# Patient Record
Sex: FEMALE | Race: WHITE | NOT HISPANIC OR LATINO | Employment: OTHER | URBAN - METROPOLITAN AREA
[De-identification: names, ages, dates, MRNs, and addresses within clinical notes are randomized per-mention and may not be internally consistent; named-entity substitution may affect disease eponyms.]

---

## 2024-01-25 ENCOUNTER — HOSPITAL ENCOUNTER (OUTPATIENT)
Dept: RADIOLOGY | Facility: HOSPITAL | Age: 84
Discharge: HOME | End: 2024-01-25
Payer: MEDICARE

## 2024-01-25 DIAGNOSIS — Z85.3 PERSONAL HISTORY OF MALIGNANT NEOPLASM OF BREAST: ICD-10-CM

## 2024-01-25 DIAGNOSIS — Z08 ENCOUNTER FOR FOLLOW-UP EXAMINATION AFTER COMPLETED TREATMENT FOR MALIGNANT NEOPLASM: ICD-10-CM

## 2024-01-25 PROCEDURE — 77061 BREAST TOMOSYNTHESIS UNI: CPT | Mod: RT

## 2024-01-25 PROCEDURE — G0279 TOMOSYNTHESIS, MAMMO: HCPCS | Mod: RIGHT SIDE | Performed by: RADIOLOGY

## 2024-01-25 PROCEDURE — 77065 DX MAMMO INCL CAD UNI: CPT | Mod: RIGHT SIDE | Performed by: RADIOLOGY

## 2024-08-13 PROBLEM — C50.912 MALIGNANT NEOPLASM OF LEFT BREAST IN FEMALE, ESTROGEN RECEPTOR POSITIVE (MULTI): Status: ACTIVE | Noted: 2024-08-13

## 2024-08-13 PROBLEM — Z17.0 MALIGNANT NEOPLASM OF LEFT BREAST IN FEMALE, ESTROGEN RECEPTOR POSITIVE (MULTI): Status: ACTIVE | Noted: 2024-08-13

## 2024-08-13 PROBLEM — Z79.810 ENCOUNTER FOR MONITORING TAMOXIFEN THERAPY: Status: ACTIVE | Noted: 2024-08-13

## 2024-08-13 PROBLEM — Z51.81 ENCOUNTER FOR MONITORING TAMOXIFEN THERAPY: Status: ACTIVE | Noted: 2024-08-13

## 2024-08-13 PROBLEM — Z08 ENCOUNTER FOR FOLLOW-UP SURVEILLANCE OF BREAST CANCER: Status: ACTIVE | Noted: 2024-08-13

## 2024-08-13 PROBLEM — Z12.31 ENCOUNTER FOR SCREENING MAMMOGRAM FOR MALIGNANT NEOPLASM OF BREAST: Status: ACTIVE | Noted: 2024-08-13

## 2024-08-13 PROBLEM — Z85.3 ENCOUNTER FOR FOLLOW-UP SURVEILLANCE OF BREAST CANCER: Status: ACTIVE | Noted: 2024-08-13

## 2024-08-14 ENCOUNTER — OFFICE VISIT (OUTPATIENT)
Dept: HEMATOLOGY/ONCOLOGY | Facility: CLINIC | Age: 84
End: 2024-08-14
Payer: MEDICARE

## 2024-08-14 VITALS
HEART RATE: 75 BPM | BODY MASS INDEX: 22.47 KG/M2 | DIASTOLIC BLOOD PRESSURE: 81 MMHG | TEMPERATURE: 98.8 F | SYSTOLIC BLOOD PRESSURE: 118 MMHG | WEIGHT: 135.03 LBS

## 2024-08-14 DIAGNOSIS — Z17.0 MALIGNANT NEOPLASM OF LEFT BREAST IN FEMALE, ESTROGEN RECEPTOR POSITIVE, UNSPECIFIED SITE OF BREAST (MULTI): Primary | ICD-10-CM

## 2024-08-14 DIAGNOSIS — Z85.3 ENCOUNTER FOR FOLLOW-UP SURVEILLANCE OF BREAST CANCER: ICD-10-CM

## 2024-08-14 DIAGNOSIS — Z51.81 ENCOUNTER FOR MONITORING TAMOXIFEN THERAPY: ICD-10-CM

## 2024-08-14 DIAGNOSIS — Z79.810 ENCOUNTER FOR MONITORING TAMOXIFEN THERAPY: ICD-10-CM

## 2024-08-14 DIAGNOSIS — C50.912 MALIGNANT NEOPLASM OF LEFT BREAST IN FEMALE, ESTROGEN RECEPTOR POSITIVE, UNSPECIFIED SITE OF BREAST (MULTI): Primary | ICD-10-CM

## 2024-08-14 DIAGNOSIS — Z08 ENCOUNTER FOR FOLLOW-UP SURVEILLANCE OF BREAST CANCER: ICD-10-CM

## 2024-08-14 DIAGNOSIS — Z12.31 ENCOUNTER FOR SCREENING MAMMOGRAM FOR MALIGNANT NEOPLASM OF BREAST: ICD-10-CM

## 2024-08-14 PROCEDURE — 1159F MED LIST DOCD IN RCRD: CPT | Performed by: NURSE PRACTITIONER

## 2024-08-14 PROCEDURE — 1160F RVW MEDS BY RX/DR IN RCRD: CPT | Performed by: NURSE PRACTITIONER

## 2024-08-14 PROCEDURE — 1126F AMNT PAIN NOTED NONE PRSNT: CPT | Performed by: NURSE PRACTITIONER

## 2024-08-14 PROCEDURE — 99215 OFFICE O/P EST HI 40 MIN: CPT | Performed by: NURSE PRACTITIONER

## 2024-08-14 RX ORDER — TAMOXIFEN CITRATE 20 MG/1
20 TABLET ORAL DAILY
Qty: 90 TABLET | Refills: 3 | Status: SHIPPED | OUTPATIENT
Start: 2024-08-14 | End: 2025-08-14

## 2024-08-14 RX ORDER — DEXTROMETHORPHAN HYDROBROMIDE, GUAIFENESIN 5; 100 MG/5ML; MG/5ML
650 LIQUID ORAL EVERY 8 HOURS PRN
COMMUNITY

## 2024-08-14 RX ORDER — LEVOTHYROXINE SODIUM 100 UG/1
100 CAPSULE ORAL DAILY
COMMUNITY

## 2024-08-14 RX ORDER — TAMOXIFEN CITRATE 20 MG/1
20 TABLET ORAL DAILY
COMMUNITY
End: 2024-08-14 | Stop reason: SDUPTHER

## 2024-08-14 RX ORDER — PRAVASTATIN SODIUM 10 MG/1
10 TABLET ORAL NIGHTLY
COMMUNITY

## 2024-08-14 ASSESSMENT — PATIENT HEALTH QUESTIONNAIRE - PHQ9
SUM OF ALL RESPONSES TO PHQ9 QUESTIONS 1 & 2: 0
1. LITTLE INTEREST OR PLEASURE IN DOING THINGS: NOT AT ALL
2. FEELING DOWN, DEPRESSED OR HOPELESS: NOT AT ALL

## 2024-08-14 ASSESSMENT — PAIN SCALES - GENERAL: PAINLEVEL: 0-NO PAIN

## 2024-08-14 NOTE — PROGRESS NOTES
Patient ID: Lucrecia Avery is a 83 y.o. female.  BREAST CANCER DIAGNOSIS:   Dec 2019 self detected left breast mass. Clinical T2 (2.8cm) N0 MX ILC of left breast, grade 2, ER > 95%, KS negative and HER2 negative. Mammaprint result showing low risk luminal A. Started neoadjuvant Tamoxifen. 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 Continued on Tamoxifen.     Past Medical History: Lucrecia has a past medical history of Breast cancer (Multi) and Personal history of (healed) traumatic fracture.  Surgical History:  Lucrecia has a past surgical history that includes Other surgical history (2020); Other surgical history (2020); Other surgical history (2020); Other surgical history (2020); Other surgical history (2020); Other surgical history (2020); and Other surgical history (2020).  Social History:  Lucrecia lives with  Marcelino- has alzheimer's disease   - 2 daughters, 1 son. 6 Grandchildren. - Marcelino for 58 yrs.  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     Social Substance History:  ·  Smoking Status never smoker   ·  Alcohol Use occasionally, 1 drink every other week   ·  Drug Use denies   ·  Additional History       Family History:    Family History   Problem Relation Name Age of Onset    Breast cancer Paternal Grandmother       Family Oncology History:  Cancer-related family history includes Breast cancer in her paternal grandmother.    HISTORY OF PRESENT ILLNESS:  Lucrecia Avery is a 83 y.o. female who presents today for Breast cancer treatment follow-up and surveillance. She is compliant on daily tamoxifen. She is doing well today. She has no new breast cancer concerns.      She denies any chest pain or breathing issues with exception to unchanged shortness of breath with stairs when doing laundry which is unchanged. She denies any concerning cough     She denies any  vision changes or headache issues, dizziness or loss of balance, no falls      She denies any new or unexplained bone aches or pains with exception to baseline age related joint pain and stiffness with worsened knee pain. She is no longer able to take ibuprofen due to worsened kidney function.       She denies any skin lesions or masses, oral sores lesions or infections     She reports a normal appetite and normal bowel movements with miralax, normal urination. She is not sexually active.     She denies any issues with sleep. She struggles her whole life with low energy. She reports increase in situational fatigue as her 's dementia has worsened. She nightly will fall asleep during the 6pm news.      She walks for exercise 3 days per week with her . Takes a daily Vit D3 2,000 International Unit(s). She takes a calcium 600 mg daily.     Review of Systems - Oncology  ROS 14 points performed, See HPI for exceptions    OBJECTIVE:    VS / Pain:  /81 (BP Location: Left arm, Patient Position: Sitting, BP Cuff Size: Small adult)   Pulse 75   Temp 37.1 °C (98.8 °F) (Temporal)   Wt 61.3 kg (135 lb 0.5 oz)   BMI 22.47 kg/m²   BSA: 1.68 meters squared   Pain Scale: 3  ECO- Restricted in physically strenuous activity.  Carries out light duty.           Physical Exam  Constitutional: Well developed, awake/alert/oriented x4, no distress, alert and cooperative  EYES: Sclera clear  ENMT: mucous membranes moist, no apparent injury, no lesions seen  Head/Neck: Neck supple, no apparent injury, thyroid without mass or tenderness, No JVD, trachea midline, no bruits  Respiratory / Thoracic: Patent airways, clear to all lobes, normal breath sounds with good chest expansion, thorax symmetric.  Cardiovascular: Regular, rate and rhythm, no murmurs, 2+ equal pulses of the extremities, normal auscultated S 1and S 2  GI: Nondistended, soft, non-tender, no rebound tenderness or guarding, no masses palpable, no  organomegaly, +BS, no bruits  Musculoskeletal: ROM intact, no joint swelling, normal strength, no spinal tenderness  Extremities: normal extremities, no cyanosis edema, contusions or wounds, no clubbing  Neurological: alert and oriented x4, intact senses, motor, response and reflexes, normal strength  Breast: s/p left mastectomy. No palpable masses or lesions in right breast or left chest wall   Lymphatic: No cervical, supraclavicular, infraclavicular or axillary lymphadenopathy  Psychological: Appropriate and talkative mood and behavior  Skin: Warm and dry, no lesions, no rashes, no jaundice    Diagnostic Results     BI mammo right diagnostic tomosynthesis 01/25/2024    Narrative  Interpreted By:  Mariano Quiroga,  STUDY:  BI MAMMO RIGHT DIAGNOSTIC TOMOSYNTHESIS;  1/25/2024 11:47 am    ACCESSION NUMBER(S):  [id number]    ORDERING CLINICIAN:  MARCO CASIANO    INDICATION:  Signs/Symptoms: Annual right mammogram. History of left mastectomy  Z08: Encounter for follow-up surveillance of breast cancer Z85.3: .    COMPARISON:  02/16/2023 and 02/07/2017    FINDINGS:  2D and tomosynthesis images of the right breast were reviewed at 1 mm  slice thickness.    Density:  The breast tissue is almost entirely fatty.    No suspicious masses or calcifications are identified.    This study was interpreted with CAD. Markers: [place]- skin lesion;  triangle- palpable abnormality    Impression  No mammographic evidence of malignancy.    BI-RADS CATEGORY:    BI-RADS Category:  1 Negative.  Recommendation:  Routine Screening Mammogram in 1 Year.  Recommended Date:  1 Year.  Laterality:  Bilateral.    For any future breast imaging appointments, please call [phone]  ([phone]).      MACRO:  None    Signed by: Mariano Quiroga 1/25/2024 12:33 PM  Dictation workstation:   [id number]       === 08/15/23 ===    DEXA BONE DENSITY    - Impression -  DEXA:  According to World Health Organization criteria,  classification is  low bone mass T-score  -2.3 (osteopenia)    Followup recommended in two years or sooner as clinically warranted.    All images and detailed analysis are available on the  Radiology  PACS.    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 Mammoprint low luminal A. She was initiated on neoadjuvant Tamoxifen Dec 2019/Jan 2020 per Dr Abraham Nicole. S/p Simple mastectomy with SLND- mixed IDC. ILC 0/2 SLN. Tamoxifen resumed following post op healing.     Again reviewed CTS-5 for recurrence risk in the 5-10 year period, is considered intermediate risk. We have reviewed a 7-10 year course is warranted. She is agreeable to this plan - goal Dec 2026.      No evidence today of side effects or toxicities on tamoxifen. I have updated prescription today. She will now follow with me annually and I will manage annual mammograms. She is agreeable to this plan         Bone Health, General Health maintenance.   Established with PCP Dr Ospina- last DEXA completed Fall 2023 showing T-score -2.3 osteopenia.        General Health Maintenance  Follows with PCP and GYN         At least 25 minutes of direct  consultation was spent with the patient today reviewing her cancer care plan, educating and answering questions regarding ongoing follow up, greater than 50% in counseling and coordination of care.    Treatment Plan:  [No matching plan found]         Thank you for the opportunity to be involved in the care of Lucrecia Avery.   We discussed the clinical significance of diagnosis, goals of care and treatment plan in detail.   Please do not hesitate to reach out with any questions. Thank you.     -------------------------------------------------------------------------------------------------------------------------------  Anneliese Kaminski MSN, APRN, FNP-C  [facility]  Division of Medical Oncology- Breast   Collaborating Physician Dr. Alberto Rogers   Team Nurse Partners Heidy Galicia, Allison Starkey [place] [address]  Phone: [phone]  Fax: [phone]  Available via Secure Chat    Confidential Peer Review Document-  Privilege  Privileged Pursuant to [place] Revised Code Section 2305.24, .25, .251 & .252

## 2024-08-14 NOTE — PATIENT INSTRUCTIONS
Anneliese BROCK CNP follow-up August 2025. Today we have again reviewed CTS-5 for recurrence risk in the  5-10 year period, is considered intermediate risk. We have reviewed a 7-10 year course is warranted. Plan is Dec 2026.  I have updated your prescription today for daily tamoxifen.     Bianka Burnette CNP follow up has completed.      GYN appointment as scheduled.     PCP  Dr Ospina annually and as needed. I would reconsider seeing ortho for knee arthritis. It is safe to add in glucosamine / Chondroitin      Call with any questions, concerns or treatment intolerance prior to next office visit [phone].

## 2025-05-07 ENCOUNTER — HOSPITAL ENCOUNTER (OUTPATIENT)
Dept: RADIOLOGY | Facility: HOSPITAL | Age: 85
Discharge: HOME | End: 2025-05-07
Payer: MEDICARE

## 2025-05-07 VITALS — WEIGHT: 135 LBS | HEIGHT: 65 IN | BODY MASS INDEX: 22.49 KG/M2

## 2025-05-07 DIAGNOSIS — Z17.0 MALIGNANT NEOPLASM OF LEFT BREAST IN FEMALE, ESTROGEN RECEPTOR POSITIVE, UNSPECIFIED SITE OF BREAST: ICD-10-CM

## 2025-05-07 DIAGNOSIS — Z85.3 ENCOUNTER FOR FOLLOW-UP SURVEILLANCE OF BREAST CANCER: ICD-10-CM

## 2025-05-07 DIAGNOSIS — C50.912 MALIGNANT NEOPLASM OF LEFT BREAST IN FEMALE, ESTROGEN RECEPTOR POSITIVE, UNSPECIFIED SITE OF BREAST: ICD-10-CM

## 2025-05-07 DIAGNOSIS — Z12.31 ENCOUNTER FOR SCREENING MAMMOGRAM FOR MALIGNANT NEOPLASM OF BREAST: ICD-10-CM

## 2025-05-07 DIAGNOSIS — Z51.81 ENCOUNTER FOR MONITORING TAMOXIFEN THERAPY: ICD-10-CM

## 2025-05-07 DIAGNOSIS — Z08 ENCOUNTER FOR FOLLOW-UP SURVEILLANCE OF BREAST CANCER: ICD-10-CM

## 2025-05-07 DIAGNOSIS — Z79.810 ENCOUNTER FOR MONITORING TAMOXIFEN THERAPY: ICD-10-CM

## 2025-05-07 PROCEDURE — 77067 SCR MAMMO BI INCL CAD: CPT | Mod: RIGHT SIDE | Performed by: RADIOLOGY

## 2025-05-07 PROCEDURE — 77061 BREAST TOMOSYNTHESIS UNI: CPT | Mod: RT

## 2025-05-07 PROCEDURE — 77063 BREAST TOMOSYNTHESIS BI: CPT | Mod: RIGHT SIDE | Performed by: RADIOLOGY

## 2025-05-08 ENCOUNTER — TELEPHONE (OUTPATIENT)
Dept: HEMATOLOGY/ONCOLOGY | Facility: HOSPITAL | Age: 85
End: 2025-05-08
Payer: MEDICARE

## 2025-08-05 ENCOUNTER — OFFICE VISIT (OUTPATIENT)
Dept: HEMATOLOGY/ONCOLOGY | Facility: CLINIC | Age: 85
End: 2025-08-05
Payer: MEDICARE

## 2025-08-05 VITALS
DIASTOLIC BLOOD PRESSURE: 82 MMHG | HEART RATE: 71 BPM | OXYGEN SATURATION: 95 % | TEMPERATURE: 96.5 F | WEIGHT: 130.4 LBS | SYSTOLIC BLOOD PRESSURE: 122 MMHG | BODY MASS INDEX: 21.7 KG/M2

## 2025-08-05 DIAGNOSIS — Z12.31 ENCOUNTER FOR SCREENING MAMMOGRAM FOR MALIGNANT NEOPLASM OF BREAST: ICD-10-CM

## 2025-08-05 DIAGNOSIS — Z17.0 MALIGNANT NEOPLASM OF LEFT BREAST IN FEMALE, ESTROGEN RECEPTOR POSITIVE, UNSPECIFIED SITE OF BREAST: ICD-10-CM

## 2025-08-05 DIAGNOSIS — Z79.810 ENCOUNTER FOR MONITORING TAMOXIFEN THERAPY: ICD-10-CM

## 2025-08-05 DIAGNOSIS — Z08 ENCOUNTER FOR FOLLOW-UP SURVEILLANCE OF BREAST CANCER: ICD-10-CM

## 2025-08-05 DIAGNOSIS — Z51.81 ENCOUNTER FOR MONITORING TAMOXIFEN THERAPY: ICD-10-CM

## 2025-08-05 DIAGNOSIS — Z85.3 ENCOUNTER FOR FOLLOW-UP SURVEILLANCE OF BREAST CANCER: ICD-10-CM

## 2025-08-05 DIAGNOSIS — C50.912 MALIGNANT NEOPLASM OF LEFT BREAST IN FEMALE, ESTROGEN RECEPTOR POSITIVE, UNSPECIFIED SITE OF BREAST: ICD-10-CM

## 2025-08-05 PROCEDURE — 99214 OFFICE O/P EST MOD 30 MIN: CPT | Performed by: NURSE PRACTITIONER

## 2025-08-05 PROCEDURE — 1036F TOBACCO NON-USER: CPT | Performed by: NURSE PRACTITIONER

## 2025-08-05 PROCEDURE — 1160F RVW MEDS BY RX/DR IN RCRD: CPT | Performed by: NURSE PRACTITIONER

## 2025-08-05 PROCEDURE — 1159F MED LIST DOCD IN RCRD: CPT | Performed by: NURSE PRACTITIONER

## 2025-08-05 PROCEDURE — 1126F AMNT PAIN NOTED NONE PRSNT: CPT | Performed by: NURSE PRACTITIONER

## 2025-08-05 RX ORDER — TAMOXIFEN CITRATE 20 MG/1
20 TABLET ORAL DAILY
Qty: 90 TABLET | Refills: 3 | Status: SHIPPED | OUTPATIENT
Start: 2025-08-05 | End: 2026-08-05

## 2025-08-05 ASSESSMENT — PAIN SCALES - GENERAL: PAINLEVEL_OUTOF10: 0-NO PAIN

## 2025-08-05 NOTE — PROGRESS NOTES
Patient ID: Lucrecia Avery is a 84 y.o. female.  BREAST CANCER DIAGNOSIS:   Dec 2019 self detected left breast mass. Clinical T2 (2.8cm) N0 MX ILC of left breast, grade 2, ER > 95%, [place] negative and HER2 negative. Mammaprint result showing low risk luminal A. Started neoadjuvant Tamoxifen. 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 Continued on Tamoxifen.     Past Medical History: Lucrecia has a past medical history of Breast cancer, antineoplastic chemo (2019), and Personal history of (healed) traumatic fracture.  Surgical History:  Lucrecia has a past surgical history that includes Other surgical history (2020); Other surgical history (2020); Other surgical history (2020); Other surgical history (2020); Other surgical history (2020); Other surgical history (2020); Other surgical history (2020); Mastectomy (Left, ); and Breast biopsy ().  Social History:  Lucrecia lives with  Marcelino- has alzheimer's disease   - 2 daughters, 1 son. 6 Grandchildren. - Marcelino for 58 yrs.  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     Social Substance History:  ·  Smoking Status never smoker   ·  Alcohol Use occasionally, 1 drink every other week   ·  Drug Use denies   ·  Additional History       Family History:    Family History   Problem Relation Name Age of Onset    Breast cancer Paternal Grandmother       Family Oncology History:  Cancer-related family history includes Breast cancer in her paternal grandmother.    HISTORY OF PRESENT ILLNESS:  Lucrecia Avery is a 84 y.o. female who presents today for Breast cancer treatment follow-up and surveillance. Today I have reviewed with the patient I will be conducting a clinical physical breast exam. Patient has declined a second medical professional today during the exam as a chapperone.     She is compliant on daily tamoxifen. She is  doing well today. She has no new breast cancer concerns. Reports increase in stress due to moving her  to [facility] and grandson with a recent motorcycle accident.      She denies any chest pain or breathing issues with exception to unchanged baseline shortness of breath with stairs when doing laundry which is unchanged. She denies any concerning cough     She report she is due for hearing test dur to worsening of hearing and is due for updated glasses prescription. She otherwise denies any vision changes or headache issues, dizziness or loss of balance, no falls      She denies any new or unexplained bone aches or pains with exception to baseline age related joint pain and stiffness with worsened knee pain. She did complete an knee injection which has helped substantially.      She denies any skin lesions or masses, oral sores lesions or infections     She reports a normal appetite and normal bowel movements with miralax, normal urination. She has seen Uro-GYN for night time urination- did trial a medication that was causing some aches so she stopped this.      She denies any issues with sleep.      She walks for exercise when she is able. Takes a daily Vit D3 2,000 International Unit(s). She takes a calcium 600 mg daily.     Review of Systems - Oncology  ROS 14 points performed, See HPI for exceptions    OBJECTIVE:    VS / Pain:  /82 (BP Location: Right arm, Patient Position: Sitting, BP Cuff Size: Adult)   Pulse 71   Temp 35.8 °C (96.5 °F) (Temporal)   Wt 59.1 kg (130 lb 6.4 oz)   SpO2 95%   BMI 21.70 kg/m²   BSA: 1.65 meters squared   Pain Scale: 3  ECO- Restricted in physically strenuous activity.  Carries out light duty.      Physical Exam  Constitutional: Well developed, awake/alert/oriented x4, no distress, alert and cooperative  EYES: Sclera clear  ENMT: mucous membranes moist, no apparent injury, no lesions seen  Head/Neck: Neck supple, no apparent injury, thyroid without mass or  tenderness, No JVD, trachea midline, no bruits  Respiratory / Thoracic: Patent airways, clear to all lobes, normal breath sounds with good chest expansion, thorax symmetric.  Cardiovascular: Regular, rate and rhythm, no murmurs, 2+ equal pulses of the extremities, normal auscultated S 1and S 2  GI: Nondistended, soft, non-tender, no rebound tenderness or guarding, no masses palpable, no organomegaly, +BS, no bruits  Musculoskeletal: ROM intact, no joint swelling, normal strength, no spinal tenderness  Extremities: normal extremities, no cyanosis edema, contusions or wounds, no clubbing  Neurological: alert and oriented x4, intact senses, motor, response and reflexes, normal strength  Breast: s/p left mastectomy. No palpable masses or lesions in right breast or left chest wall   Lymphatic: No cervical, supraclavicular, infraclavicular or axillary lymphadenopathy  Psychological: Appropriate and talkative mood and behavior  Skin: Warm and dry, no lesions, no rashes, no jaundice    Diagnostic Results   Narrative & Impression   Interpreted By:  Mariano Quiroga,   STUDY:  BI MAMMO RIGHT SCREENING TOMOSYNTHESIS;  5/7/2025 4:11 pm      ACCESSION NUMBER(S):  [id number]      ORDERING CLINICIAN:  MARTIN CRAIG      INDICATION:  Signs/Symptoms:Annual mammogram hx breast cancer.      ,C50.912 Malignant neoplasm of unspecified site of left female  breast,Z17.0 Estrogen receptor positive status (ER+),Z51.81 Encounter  for therapeutic drug level monitoring,Z79.810 Long term (current) use  of selective estrogen receptor modulators (serms),Z12.31 Encounter  for screening mammogram for malignant neoplasm of breast,Z08  Encounter for follow-up examination after completed treatment for  malignant neoplasm,Z85.3 Personal history of malignant neoplasm of  breast      COMPARISON:  01/25/2024 and 01/12/2011      FINDINGS:  2D and tomosynthesis images were reviewed at 1 mm slice thickness.      Density:  The breasts are heterogeneously  dense, which may obscure  small masses.      No suspicious masses or calcifications are identified.      This study was interpreted with CAD. Markers: [place]- skin lesion;  triangle- palpable abnormality      IMPRESSION:  No mammographic evidence of malignancy.      BI-RADS CATEGORY:      BI-RADS Category:  1 Negative.  Recommendation:  Routine Screening Mammogram in 1 Year.  Recommended Date:  1 Year.  Laterality:  Bilateral.      For any future breast imaging appointments, please call [phone] ([phone]).          MACRO:  None      Signed by: Mariano Quiroga 5/8/2025 8:45 AM          === 08/15/23 ===    DEXA BONE DENSITY    - Impression -  DEXA:  According to World Health Organization criteria,  classification is  low bone mass T-score -2.3 (osteopenia)    Followup recommended in two years or sooner as clinically warranted.    All images and detailed analysis are available on the  Radiology  PACS.    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 Mammoprint low luminal A. She was initiated on neoadjuvant Tamoxifen Dec 2019/Jan 2020 per Dr Abraham Nicole. S/p Simple mastectomy with  SLND- mixed IDC. ILC 0/2 SLN. Tamoxifen resumed following post op healing.     Again reviewed CTS-5 for recurrence risk in the 5-10 year period, is considered intermediate risk. We have reviewed a 7-10 year course is warranted. She is agreeable to this plan - goal Dec 2026. Updated Prescription today.      There is no evidence on clinical exam for breast cancer recurrence.  Continued annual follow up,  annual mammogram.         Bone Health, General Health maintenance.   Established with PCP Dr Ospina- last DEXA completed Fall 2023 showing T-score -2.3 osteopenia.  Orders placed per PCP for repeat which can be completed as early as Sept 2025     General Health Maintenance  Follows with PCP and GYN         At least 20 minutes of direct consultation was spent with the patient today reviewing her cancer care plan, educating and answering questions regarding ongoing follow up, greater than 50% in counseling and coordination of care.    Treatment Plan:  [No matching plan found]         Thank you for the opportunity to be involved your care.   We discussed the clinical significance of diagnosis, goals of care and treatment plan in detail.   Please do not hesitate to reach out with any questions.       -------------------------------------------------------------------------------------------------------------------------------  Anneliese Kaminski MSN, APRN, FNP-C  [facility]  Division of Medical Oncology- Breast   Collaborating Physician Dr. Alberto Rogers   [facility] [place] [address]  Phone: [phone]  Fax: [phone]  Available via Secure Chat    Confidential Peer Review Document-  Privilege  Privileged Pursuant to [place] Revised Code Section 2305.24, .25, .251 & .252

## 2025-08-05 NOTE — PATIENT INSTRUCTIONS
Anneliese BROCK, CNP follow-up August/ Sept 2026     Today we have again reviewed CTS-5 for recurrence risk in the  5-10 year period, is considered intermediate risk. We have reviewed a 7-10 year course is warranted. Plan is Dec 2026.  I have updated your prescription today for daily tamoxifen for a full year.    Annual mammogram is due AFTER 5/8/2026     GYN appointment as scheduled.     PCP  Dr Ospina annually and as needed. Repeat Bone density was ordered by PCP- can be done as early as Sept 2025.       Call with any questions, concerns or treatment intolerance prior to next office visit [phone].

## 2025-08-07 ENCOUNTER — APPOINTMENT (OUTPATIENT)
Dept: SURGICAL ONCOLOGY | Facility: CLINIC | Age: 85
End: 2025-08-07
Payer: MEDICARE